# Patient Record
Sex: FEMALE | Race: WHITE
[De-identification: names, ages, dates, MRNs, and addresses within clinical notes are randomized per-mention and may not be internally consistent; named-entity substitution may affect disease eponyms.]

---

## 2018-04-23 ENCOUNTER — HOSPITAL ENCOUNTER (EMERGENCY)
Dept: HOSPITAL 43 - DL.ED | Age: 83
LOS: 1 days | Discharge: HOME | End: 2018-04-24
Payer: MEDICARE

## 2018-04-23 DIAGNOSIS — E11.9: ICD-10-CM

## 2018-04-23 DIAGNOSIS — Z88.5: ICD-10-CM

## 2018-04-23 DIAGNOSIS — M25.561: Primary | ICD-10-CM

## 2018-04-23 DIAGNOSIS — Z88.8: ICD-10-CM

## 2018-04-23 DIAGNOSIS — R53.1: ICD-10-CM

## 2018-04-23 DIAGNOSIS — Z88.1: ICD-10-CM

## 2018-04-23 DIAGNOSIS — Z79.4: ICD-10-CM

## 2018-04-23 LAB
CHLORIDE SERPL-SCNC: 102 MMOL/L (ref 101–111)
SODIUM SERPL-SCNC: 134 MMOL/L (ref 135–145)

## 2018-04-23 PROCEDURE — 99284 EMERGENCY DEPT VISIT MOD MDM: CPT

## 2018-04-23 PROCEDURE — 36415 COLL VENOUS BLD VENIPUNCTURE: CPT

## 2018-04-23 PROCEDURE — 73562 X-RAY EXAM OF KNEE 3: CPT

## 2018-04-23 PROCEDURE — 72192 CT PELVIS W/O DYE: CPT

## 2018-04-23 PROCEDURE — 85025 COMPLETE CBC W/AUTO DIFF WBC: CPT

## 2018-04-23 PROCEDURE — 81001 URINALYSIS AUTO W/SCOPE: CPT

## 2018-04-23 PROCEDURE — 80053 COMPREHEN METABOLIC PANEL: CPT

## 2018-04-24 VITALS — SYSTOLIC BLOOD PRESSURE: 113 MMHG | DIASTOLIC BLOOD PRESSURE: 62 MMHG

## 2018-04-24 NOTE — EDM.PDOC
ED HPI GENERAL MEDICAL PROBLEM





- General


Chief Complaint: Lower Extremity Injury/Pain


Stated Complaint: KNEE INJURY


Time Seen by Provider: 04/23/18 21:45


Source of Information: Reports: Patient, EMS, Family


History Limitations: Reports: No Limitations





- History of Present Illness


INITIAL COMMENTS - FREE TEXT/NARRATIVE: 





C/o right knee and leg pain. Stated felt weak like legs were going to give out 

while getting out of bed, lowered self to ground to knees then crawled to 

kitchen, pressed medical alert as unable to get up. Denied falling. Had similar 

episode this am. No recent fever, chils. No NVD.  no URI or urinary complaints. 

Lives at home alone. 


  ** Right Leg


Pain Score (Numeric/FACES): 7





- Related Data


 Allergies











Allergy/AdvReac Type Severity Reaction Status Date / Time


 


atorvastatin [From Lipitor] Allergy  Muscle Verified 11/18/16 13:06





   Aches  


 


erythromycin base Allergy  Hives Verified 11/18/16 13:06


 


ezetimibe Allergy  Cannot Verified 11/18/16 13:06





   Remember  


 


fluvastatin Allergy  Cannot Verified 11/18/16 13:06





   Remember  


 


losartan Allergy  Cannot Verified 11/18/16 13:06





   Remember  


 


pravastatin Allergy  Cannot Verified 11/18/16 13:06





   Remember  


 


simvastatin Allergy  Cannot Verified 11/18/16 13:06





   Remember  


 


tramadol Allergy  Cannot Verified 11/18/16 13:06





   Remember  











Home Meds: 


 Home Meds





Insulin Glarg,Human.Rec.Analog [LantUS Solostar] 62 units SQ BEDTIME 11/18/16 [

History]


Lisinopril [Zestril] 2.5 mg PO DAILY 11/18/16 [History]


oxyCODONE HCl/Acetaminophen [Endocet 5-325 Tablet] 1 tab PO Q4H PRN 11/29/16 [

History]











Past Medical History


HEENT History: Reports: Cataract, Impaired Vision


Musculoskeletal History: Reports: Other (See Below)


Endocrine/Metabolic History: Reports: Diabetes, Type II





- Past Surgical History


HEENT Surgical History: Reports: Cataract Surgery


GI Surgical History: Reports: Cholecystectomy


Musculoskeletal Surgical History: Reports: None, Hip Replacement





Social & Family History





- Tobacco Use


Smoking Status *Q: Never Smoker


Second Hand Smoke Exposure: No





- Caffeine Use


Caffeine Use: Reports: Coffee, Tea





- Recreational Drug Use


Recreational Drug Use: No





Review of Systems





- Review of Systems


Review Of Systems: ROS reveals no pertinent complaints other than HPI.





ED EXAM, GENERAL





- Physical Exam


Exam: See Below


Exam Limited By: No Limitations


General Appearance: Alert, No Apparent Distress, Thin


Eye Exam: Bilateral Eye: EOMI


Ears: Normal External Exam, Normal TMs


Nose: Normal Inspection


Throat/Mouth: Normal Inspection


Head: Atraumatic, Normocephalic


Neck: Normal Inspection


Respiratory/Chest: No Respiratory Distress, Lungs Clear, Normal Breath Sounds


Cardiovascular: Normal Peripheral Pulses, Regular Rate, Rhythm, No Edema


GI/Abdominal: Normal Bowel Sounds


Back Exam: Normal Inspection.  No: Paraspinal Tenderness, Vertebral Tenderness


Extremities: Leg Pain (right knee)


Neurological: Alert, Oriented, Normal Cognition, No Motor/Sensory Deficits


Psychiatric: Normal Affect


Skin Exam: Warm, Dry, Intact, Normal Color.  No: Ecchymosis





Course





- Vital Signs


Last Recorded V/S: 


 Last Vital Signs











Temp  98.4 F   04/24/18 00:12


 


Pulse  80   04/24/18 00:12


 


Resp  18   04/24/18 00:12


 


BP  113/62   04/24/18 00:12


 


Pulse Ox  95   04/24/18 00:12














- Orders/Labs/Meds


Orders: 


 Active Orders 24 hr











 Category Date Time Status


 


 UA W/MICROSCOPIC [URIN] Stat Lab  04/23/18 23:48 Ordered











Labs: 


 Laboratory Tests











  04/23/18 04/23/18 04/23/18 Range/Units





  22:50 22:50 23:48 


 


WBC  8.5    (5.0-10.0)  10^3/uL


 


RBC  4.59    (4.2-5.4)  10^6/uL


 


Hgb  13.9    (12.0-16.0)  g/dL


 


Hct  40.9    (37.0-47.0)  %


 


MCV  89.1    ()  fL


 


MCH  30.3    (27.0-34.0)  pg


 


MCHC  34.0    (33.0-35.0)  g/dL


 


Plt Count  208    (150-450)  10^3/uL


 


Neut % (Auto)  61.7    (42.2-75.2)  %


 


Lymph % (Auto)  28.2    (20.5-50.1)  %


 


Mono % (Auto)  8.3 H    (2-8)  %


 


Eos % (Auto)  1.6    (1.0-3.0)  %


 


Baso % (Auto)  0.2    (0.0-1.0)  %


 


Sodium   134 L   (135-145)  mmol/L


 


Potassium   3.9   (3.6-5.0)  mmol/L


 


Chloride   102   (101-111)  mmol/L


 


Carbon Dioxide   25.0   (21.0-31.0)  mmol/L


 


Anion Gap   10.9   


 


BUN   10   (7-18)  mg/dL


 


Creatinine   0.8   (0.6-1.3)  mg/dL


 


Est Cr Clr Drug Dosing   TNP   


 


Estimated GFR (MDRD)   > 60   


 


BUN/Creatinine Ratio   12.50   


 


Glucose   264 H   ()  mg/dL


 


Calcium   9.0   (8.4-10.2)  mg/dl


 


Total Bilirubin   0.5   (0.2-1.0)  mg/dL


 


AST   29   (10-42)  IU/L


 


ALT   22   (10-60)  IU/L


 


Alkaline Phosphatase   46   ()  IU/L


 


Total Protein   6.5 L   (6.7-8.2)  g/dl


 


Albumin   3.7   (3.2-5.5)  g/dl


 


Globulin   2.8   


 


Albumin/Globulin Ratio   1.32   


 


Urine Color    Yellow  (YELLOW)  


 


Urine Appearance    Clear  (CLEAR)  


 


Urine pH    7.5  (5.0-9.0)  


 


Ur Specific Gravity    1.020  (1.005-1.030)  


 


Urine Protein    Negative  (NEGATIVE)  


 


Urine Glucose (UA)    100 H  (NEGATIVE)  


 


Urine Ketones    Trace H  (NEGATIVE)  


 


Urine Occult Blood    Negative  (NEGATIVE)  


 


Urine Nitrite    Negative  (NEGATIVE)  


 


Urine Bilirubin    Negative  (NEGATIVE)  


 


Urine Urobilinogen    0.2  (0.2-1.0)  mg/dL


 


Ur Leukocyte Esterase    Negative  (NEGATIVE)  


 


Urine RBC    0-5  /HPF


 


Urine WBC    0-5  (0-5/HPF)  /HPF


 


Ur Epithelial Cells    Occasional  /HPF


 


Urine Bacteria    Few  (0-FEW/HPF)  /HPF











Meds: 


Medications














Discontinued Medications














Generic Name Dose Route Start Last Admin





  Trade Name Freq  PRN Reason Stop Dose Admin


 


Acetaminophen  650 mg  04/24/18 00:08  04/24/18 00:13





  Tylenol  PO  04/24/18 00:09  650 mg





  NOW ONE   Administration





     





     





     





     














- Radiology Interpretation


Free Text/Narrative:: 





right knee and pelvis negative. 





- Re-Assessments/Exams


Free Text/Narrative Re-Assessment/Exam: 





04/24/18 04:18


Family at bedside results relayed. Recommend family to stay with patient 

tonight and better determine ability to maintain in home environment. 





Departure





- Departure


Time of Disposition: 00:03


Disposition: Home, Self-Care 01


Condition: Fair


Clinical Impression: 


 Weakness





Knee pain, right


Qualifiers:


 Chronicity: acute Qualified Code(s): M25.561 - Pain in right knee








- Discharge Information


Instructions:  Knee Sprain, Adult, Easy-to-Read


Referrals: 


Joselin Urban PA [Primary Care Provider] - 


Forms:  ED Department Discharge


Additional Instructions: 


ambulate with walker


tylenol 650mg every 4  hours as needed for pain


follow up if worsens 





- My Orders


Last 24 Hours: 


My Active Orders





04/23/18 23:48


UA W/MICROSCOPIC [URIN] Stat 














- Assessment/Plan


Last 24 Hours: 


My Active Orders





04/23/18 23:48


UA W/MICROSCOPIC [URIN] Stat

## 2021-10-10 NOTE — CT
PROCEDURE INFORMATION: 

Exam: CT Head Without Contrast 

Exam date and time: 10/10/2021 12:56 PM 

Age: 89 years old 

Clinical indication: Dizziness 



TECHNIQUE: 

Imaging protocol: Computed tomography of the head without contrast. 

Radiation optimization: All CT scans at this facility use at least one of these 

dose optimization techniques: automated exposure control; mA and/or kV 

adjustment per patient size (includes targeted exams where dose is matched to 

clinical indication); or iterative reconstruction. 



COMPARISON: 

CT Head wo Cont 10/6/2015 12:29 AM 



FINDINGS: 

Brain: Atrophy. No hemorrhage. Chronic periventricular white matter changes.. 

No mass effect. 

Cerebral ventricles: Stable asymmetric enlargement of the left ventricle. 

Paranasal sinuses: Visualized sinuses are unremarkable. No fluid levels. 

Mastoid air cells: Visualized mastoid air cells are well aerated. 

Bones/joints: Unremarkable. No acute fracture. 

Soft tissues: Unremarkable. 



IMPRESSION: 

No acute intracranial abnormality.

## 2021-10-10 NOTE — PCM.HP
H&P History of Present Illness





- General


Date of Service: 10/10/21


Source of Information: Patient, Old Records, Provider (ER)


History Limitations: Reports: Other (poor historian)





- History of Present Illness


Initial Comments - Free Text/Narative: 


Patient was brought to ER form her assisted living  with c/o waking this morning

with "room spin" dizziness. Denies fall or head injury. Denies chest pain, rapid

or irregular HR, N/V/D, fever, chills, cough, SOB, or dysuria. She reports 

similar episode in the past. Pt states that laying still alleviates the 

dizziness. Moving her body, or even head movement makes the dizziness worse. She

laid on her bed all day to minimize the dizziness, and she missed her insulin 

then finally called the ambulance.


CT in ER showed no acute findings. Pt was prescribed Meclizine in ER and she is 

now symptoms free. 








- Related Data


Allergies/Adverse Reactions: 


                                    Allergies











Allergy/AdvReac Type Severity Reaction Status Date / Time


 


atorvastatin [From Lipitor] Allergy  Muscle Verified 10/10/21 12:43





   Aches  


 


erythromycin base Allergy  Hives Verified 10/10/21 12:43


 


ezetimibe Allergy  Cannot Verified 10/10/21 12:43





   Remember  


 


fluvastatin Allergy  Cannot Verified 10/10/21 12:43





   Remember  


 


losartan Allergy  Cannot Verified 10/10/21 12:43





   Remember  


 


pravastatin Allergy  Cannot Verified 10/10/21 12:43





   Remember  


 


simvastatin Allergy  Cannot Verified 10/10/21 12:43





   Remember  


 


tramadol Allergy  Cannot Verified 10/10/21 12:43





   Remember  











Home Medications: 


                                    Home Meds





Acetaminophen [Tylenol Extra Strength] 500 mg PO Q4H PRN 10/10/21 [History]


Ascorbic Acid [Vitamin C] 500 mg PO DAILY 10/10/21 [History]


Aspirin [Halfprin] 81 mg PO DAILY 10/10/21 [History]


Ca/D3/Mag Ox/Zinc//Barrett/Bor [Calcium 600-D3 Plus Caplet] 1 tab PO DAILY 

10/10/21 [History]


Calcium Carbonate [Tums] 500 mg PO Q4H PRN 10/10/21 [History]


DULoxetine HCl [Cymbalta] 30 mg PO DAILY 10/10/21 [History]


Diclofenac Sodium [Voltaren Arthritis Pain] 1 applic TOP Q12H PRN 10/10/21 

[History]


Fish Oil/DHA/EPA [Fish Oil 1,200 MG] 1 cap PO DAILY 10/10/21 [History]


Insulin Glarg,Human.Rec.Analog [Lantus] 37 units SQ DAILY 10/10/21 [History]


Magnesium Hydroxide [Milk of Magnesia] 30 ml PO DAILY PRN 10/10/21 [History]


Multivitamin [Multi-Day Vitamins] 1 tab PO DAILY 10/10/21 [History]


metFORMIN [Glucophage XR] 500 mg PO BIDMEALS 10/10/21 [History]


polyethylene glycoL 3350 [MiraLAX] 1 pack PO DAILY PRN 10/10/21 [History]











Past Medical History


HEENT History: Reports: Cataract, Impaired Vision


Musculoskeletal History: Reports: Other (See Below)


Endocrine/Metabolic History: Reports: Diabetes, Type II





- Past Surgical History


HEENT Surgical History: Reports: Cataract Surgery


GI Surgical History: Reports: Cholecystectomy


Musculoskeletal Surgical History: Reports: None, Hip Replacement





Social & Family History





- Tobacco Use


Tobacco Use Status *Q: Never Tobacco User


Second Hand Smoke Exposure: No





- Caffeine Use


Caffeine Use: Reports: None





- Recreational Drug Use


Recreational Drug Use: No





H&P Review of Systems





- Review of Systems:


Review Of Systems: Unable To Obtain


Reason Not Obtained: poor historian


General: Denies: Fever, Chills


HEENT: Reports: Vertigo


Pulmonary: Denies: Shortness of Breath


Cardiovascular: Denies: Chest Pain


Gastrointestinal: Denies: Abdominal Pain, Nausea


Genitourinary: Denies: Dysuria


Psychiatric: Denies: Confusion


Neurological: Denies: Confusion





Exam





- Exam


Exam: See Below





- Vital Signs


Vital Signs: 


                                Last Vital Signs











Temp  98.1 F   10/10/21 12:43


 


Pulse  84   10/10/21 12:43


 


Resp  20   10/10/21 12:43


 


BP  131/67   10/10/21 12:43


 


Pulse Ox  99   10/10/21 12:43











Weight: 171 lb





- Exam


Quality Assessment: No: Supplemental Oxygen


General: Alert, Oriented, Cooperative.  No: Mild Distress


HEENT: EOMI


Neck: Supple


Cardiovascular: Regular Rate, Regular Rhythm


GI/Abdominal Exam: Soft, Non-Tender


 (Female) Exam: Deferred


Back Exam: Normal Inspection


Extremities: Normal Inspection, No Pedal Edema


Skin: Warm, Dry


Neurological: Cranial Nerves Intact


Neuro Extensive - Mental Status: Alert, Oriented x3





- Patient Data


Lab Results Last 24 hrs: 


                         Laboratory Results - last 24 hr











  10/10/21 10/10/21 10/10/21 Range/Units





  12:38 13:02 13:02 


 


WBC   7.1   (5.0-10.0)  10^3/uL


 


RBC   4.69   (4.2-5.4)  10^6/uL


 


Hgb   14.4   (12.0-16.0)  g/dL


 


Hct   43.9   (37.0-47.0)  %


 


MCV   93.6  D   ()  fL


 


MCH   30.7   (27.0-34.0)  pg


 


MCHC   32.8 L   (33.0-35.0)  g/dL


 


Plt Count   234   (150-450)  10^3/uL


 


Neut % (Auto)   58.8   (42.2-75.2)  %


 


Lymph % (Auto)   30.0   (20.5-50.1)  %


 


Mono % (Auto)   8.5 H   (2-8)  %


 


Eos % (Auto)   2.1   (1.0-3.0)  %


 


Baso % (Auto)   0.6   (0.0-1.0)  %


 


Sodium    140  (136-145)  mmol/L


 


Potassium    4.1  (3.5-5.1)  mmol/L


 


Chloride    104  ()  mmol/L


 


Carbon Dioxide    27  (21-32)  mmol/L


 


Anion Gap    13.1 H  (7-13)  mEq/L


 


BUN    15  (7-18)  mg/dL


 


Creatinine    1.03 H  (0.55-1.02)  mg/dL


 


Est Cr Clr Drug Dosing    TNP  


 


Estimated GFR (MDRD)    50  


 


BUN/Creatinine Ratio    14.6  (No establ ref range)  


 


Glucose    133 H  (70-99)  mg/dL


 


POC Glucose  125 H    (70-99)  mg/dL


 


Calcium    8.9  (8.5-10.1)  mg/dL


 


Magnesium    2.0  (1.8-2.4)  mg/dL


 


Total Bilirubin    0.3  (0.2-1.0)  mg/dL


 


AST    18  (15-37)  U/L


 


ALT    28  (14-59)  U/L


 


Alkaline Phosphatase    70  ()  U/L


 


Troponin I High Sens    18  (<=51)  pg/mL


 


Total Protein    7.1  (6.4-8.2)  g/dL


 


Albumin    2.4 L  (3.4-5.0)  g/dL


 


Globulin    4.7  


 


Albumin/Globulin Ratio    0.51  


 


SARS CoV-2 RNA Rapid HANNAH     (NEGATIVE)  














  10/10/21 Range/Units





  15:09 


 


WBC   (5.0-10.0)  10^3/uL


 


RBC   (4.2-5.4)  10^6/uL


 


Hgb   (12.0-16.0)  g/dL


 


Hct   (37.0-47.0)  %


 


MCV   ()  fL


 


MCH   (27.0-34.0)  pg


 


MCHC   (33.0-35.0)  g/dL


 


Plt Count   (150-450)  10^3/uL


 


Neut % (Auto)   (42.2-75.2)  %


 


Lymph % (Auto)   (20.5-50.1)  %


 


Mono % (Auto)   (2-8)  %


 


Eos % (Auto)   (1.0-3.0)  %


 


Baso % (Auto)   (0.0-1.0)  %


 


Sodium   (136-145)  mmol/L


 


Potassium   (3.5-5.1)  mmol/L


 


Chloride   ()  mmol/L


 


Carbon Dioxide   (21-32)  mmol/L


 


Anion Gap   (7-13)  mEq/L


 


BUN   (7-18)  mg/dL


 


Creatinine   (0.55-1.02)  mg/dL


 


Est Cr Clr Drug Dosing   


 


Estimated GFR (MDRD)   


 


BUN/Creatinine Ratio   (No establ ref range)  


 


Glucose   (70-99)  mg/dL


 


POC Glucose   (70-99)  mg/dL


 


Calcium   (8.5-10.1)  mg/dL


 


Magnesium   (1.8-2.4)  mg/dL


 


Total Bilirubin   (0.2-1.0)  mg/dL


 


AST   (15-37)  U/L


 


ALT   (14-59)  U/L


 


Alkaline Phosphatase   ()  U/L


 


Troponin I High Sens   (<=51)  pg/mL


 


Total Protein   (6.4-8.2)  g/dL


 


Albumin   (3.4-5.0)  g/dL


 


Globulin   


 


Albumin/Globulin Ratio   


 


SARS CoV-2 RNA Rapid HANNAH  Negative  (NEGATIVE)  











Result Diagrams: 


                                 10/10/21 13:02





                                 10/10/21 13:02


Imaging Impressions Last 24 hrs: 


CT brqain: no acute.


ECG: Sinus rhythm


Problem List Initiated/Reviewed/Updated: Yes


Orders Last 24hrs: 


                               Active Orders 24 hr











 Category Date Time Status


 


 Blood Glucose Check, Bedside [RC] ONETIME Care  10/10/21 12:49 Active


 


 Orthostatic Vital Signs [RC] ASDIRECTED Care  10/10/21 12:50 Active


 


 UA RFX BLADIMIR AND CULT IF INDIC [URIN] Stat Lab  10/10/21 14:47 Ordered


 


 Sodium Chloride 0.9% [Normal Saline] 500 ml Med  10/10/21 15:00 Active





 IV .BOLUS   








                                Medication Orders





Sodium Chloride (Normal Saline)  500 mls @ 500 mls/hr IV .BOLUS CAROL


   Last Admin: 10/10/21 14:58  Dose: 500 mls/hr


   Documented by: MARIELLA








Assessment/Plan Comment:: 


Vertigo/ at risk for falls ; admitted for observation. PT/OT evaluation. 


Meclizine 25 mg BID PRN. 








DM type II : continue with home medication. 





DVT prophylaxis : Lovenox. 





DNR /DNI as per assisted living record.

## 2021-10-11 NOTE — PCM.PN
- General Info


Date of Service: 10/11/21


Subjective Update: 


feeling much better, requesting to go home. no more dizziness. 





Functional Status: Reports: Pain Controlled, Tolerating Diet, Ambulating 

(slowly)





- Review of Systems


General: Denies: Fever, Weakness


Pulmonary: Denies: Shortness of Breath


Cardiovascular: Denies: Chest Pain


Gastrointestinal: Denies: Abdominal Pain


Genitourinary: Denies: Dysuria


Neurological: Denies: Confusion


Psychiatric: Denies: Confusion





- Patient Data


Vitals - Most Recent: 


                                Last Vital Signs











Temp  97.8 F   10/11/21 12:00


 


Pulse  90   10/11/21 12:00


 


Resp  20   10/11/21 12:00


 


BP  122/65   10/11/21 12:00


 


Pulse Ox  95   10/11/21 12:00








                                        





Orthostatic Blood Pressure [     149/72


Standing]                        


Orthostatic Blood Pressure [     105/62


Sitting]                         


Orthostatic Blood Pressure [     121/57


Supine]                          








Weight - Most Recent: 160 lb 4.8 oz


I&O - Last 24 Hours: 


                                 Intake & Output











 10/10/21 10/11/21 10/11/21





 22:59 06:59 14:59


 


Intake Total  400 


 


Output Total 800 1375 


 


Balance -800 -975 











Lab Results Last 24 Hours: 


                         Laboratory Results - last 24 hr











  10/10/21 10/10/21 10/10/21 Range/Units





  13:02 13:02 15:09 


 


WBC  7.1    (5.0-10.0)  10^3/uL


 


RBC  4.69    (4.2-5.4)  10^6/uL


 


Hgb  14.4    (12.0-16.0)  g/dL


 


Hct  43.9    (37.0-47.0)  %


 


MCV  93.6  D    ()  fL


 


MCH  30.7    (27.0-34.0)  pg


 


MCHC  32.8 L    (33.0-35.0)  g/dL


 


Plt Count  234    (150-450)  10^3/uL


 


Neut % (Auto)  58.8    (42.2-75.2)  %


 


Lymph % (Auto)  30.0    (20.5-50.1)  %


 


Mono % (Auto)  8.5 H    (2-8)  %


 


Eos % (Auto)  2.1    (1.0-3.0)  %


 


Baso % (Auto)  0.6    (0.0-1.0)  %


 


Sodium   140   (136-145)  mmol/L


 


Potassium   4.1   (3.5-5.1)  mmol/L


 


Chloride   104   ()  mmol/L


 


Carbon Dioxide   27   (21-32)  mmol/L


 


Anion Gap   13.1 H   (7-13)  mEq/L


 


BUN   15   (7-18)  mg/dL


 


Creatinine   1.03 H   (0.55-1.02)  mg/dL


 


Est Cr Clr Drug Dosing   TNP   


 


Estimated GFR (MDRD)   50   


 


BUN/Creatinine Ratio   14.6   (No establ ref range)  


 


Glucose   133 H   (70-99)  mg/dL


 


POC Glucose     (70-99)  mg/dL


 


Calcium   8.9   (8.5-10.1)  mg/dL


 


Magnesium   2.0   (1.8-2.4)  mg/dL


 


Total Bilirubin   0.3   (0.2-1.0)  mg/dL


 


AST   18   (15-37)  U/L


 


ALT   28   (14-59)  U/L


 


Alkaline Phosphatase   70   ()  U/L


 


Troponin I High Sens   18   (<=51)  pg/mL


 


Total Protein   7.1   (6.4-8.2)  g/dL


 


Albumin   2.4 L   (3.4-5.0)  g/dL


 


Globulin   4.7   


 


Albumin/Globulin Ratio   0.51   


 


Urine Color     (YELLOW)  


 


Urine Appearance     (CLEAR)  


 


Urine pH     (5.0-9.0)  


 


Ur Specific Gravity     (1.005-1.030)  


 


Urine Protein     (NEGATIVE)  


 


Urine Glucose (UA)     (NEGATIVE)  


 


Urine Ketones     (NEGATIVE)  


 


Urine Occult Blood     (NEGATIVE)  


 


Urine Nitrite     (NEGATIVE)  


 


Urine Bilirubin     (NEGATIVE)  


 


Urine Urobilinogen     (0.2-1.0)  mg/dL


 


Ur Leukocyte Esterase     (NEGATIVE)  


 


Urine RBC     (0-5)  /HPF


 


Urine WBC     (0-5/HPF)  /HPF


 


Ur Epithelial Cells     (NOT SEEN)  /HPF


 


Urine Bacteria     (0-FEW/HPF)  /HPF


 


SARS CoV-2 RNA Rapid HANNAH    Negative  (NEGATIVE)  














  10/10/21 10/10/21 10/11/21 Range/Units





  17:00 20:41 00:08 


 


WBC     (5.0-10.0)  10^3/uL


 


RBC     (4.2-5.4)  10^6/uL


 


Hgb     (12.0-16.0)  g/dL


 


Hct     (37.0-47.0)  %


 


MCV     ()  fL


 


MCH     (27.0-34.0)  pg


 


MCHC     (33.0-35.0)  g/dL


 


Plt Count     (150-450)  10^3/uL


 


Neut % (Auto)     (42.2-75.2)  %


 


Lymph % (Auto)     (20.5-50.1)  %


 


Mono % (Auto)     (2-8)  %


 


Eos % (Auto)     (1.0-3.0)  %


 


Baso % (Auto)     (0.0-1.0)  %


 


Sodium     (136-145)  mmol/L


 


Potassium     (3.5-5.1)  mmol/L


 


Chloride     ()  mmol/L


 


Carbon Dioxide     (21-32)  mmol/L


 


Anion Gap     (7-13)  mEq/L


 


BUN     (7-18)  mg/dL


 


Creatinine     (0.55-1.02)  mg/dL


 


Est Cr Clr Drug Dosing     


 


Estimated GFR (MDRD)     


 


BUN/Creatinine Ratio     (No establ ref range)  


 


Glucose     (70-99)  mg/dL


 


POC Glucose   188 H  136 H  (70-99)  mg/dL


 


Calcium     (8.5-10.1)  mg/dL


 


Magnesium     (1.8-2.4)  mg/dL


 


Total Bilirubin     (0.2-1.0)  mg/dL


 


AST     (15-37)  U/L


 


ALT     (14-59)  U/L


 


Alkaline Phosphatase     ()  U/L


 


Troponin I High Sens     (<=51)  pg/mL


 


Total Protein     (6.4-8.2)  g/dL


 


Albumin     (3.4-5.0)  g/dL


 


Globulin     


 


Albumin/Globulin Ratio     


 


Urine Color  Yellow    (YELLOW)  


 


Urine Appearance  Cloudy    (CLEAR)  


 


Urine pH  7.5    (5.0-9.0)  


 


Ur Specific Gravity  1.020    (1.005-1.030)  


 


Urine Protein  Negative    (NEGATIVE)  


 


Urine Glucose (UA)  Negative    (NEGATIVE)  


 


Urine Ketones  Negative    (NEGATIVE)  


 


Urine Occult Blood  Trace-intact H    (NEGATIVE)  


 


Urine Nitrite  Positive H    (NEGATIVE)  


 


Urine Bilirubin  Negative    (NEGATIVE)  


 


Urine Urobilinogen  0.2    (0.2-1.0)  mg/dL


 


Ur Leukocyte Esterase  Large H    (NEGATIVE)  


 


Urine RBC  0-5    (0-5)  /HPF


 


Urine WBC  >100 H    (0-5/HPF)  /HPF


 


Ur Epithelial Cells  Few    (NOT SEEN)  /HPF


 


Urine Bacteria  Many H    (0-FEW/HPF)  /HPF


 


SARS CoV-2 RNA Rapid HANNAH     (NEGATIVE)  














  10/11/21 10/11/21 Range/Units





  08:11 11:28 


 


WBC    (5.0-10.0)  10^3/uL


 


RBC    (4.2-5.4)  10^6/uL


 


Hgb    (12.0-16.0)  g/dL


 


Hct    (37.0-47.0)  %


 


MCV    ()  fL


 


MCH    (27.0-34.0)  pg


 


MCHC    (33.0-35.0)  g/dL


 


Plt Count    (150-450)  10^3/uL


 


Neut % (Auto)    (42.2-75.2)  %


 


Lymph % (Auto)    (20.5-50.1)  %


 


Mono % (Auto)    (2-8)  %


 


Eos % (Auto)    (1.0-3.0)  %


 


Baso % (Auto)    (0.0-1.0)  %


 


Sodium    (136-145)  mmol/L


 


Potassium    (3.5-5.1)  mmol/L


 


Chloride    ()  mmol/L


 


Carbon Dioxide    (21-32)  mmol/L


 


Anion Gap    (7-13)  mEq/L


 


BUN    (7-18)  mg/dL


 


Creatinine    (0.55-1.02)  mg/dL


 


Est Cr Clr Drug Dosing    


 


Estimated GFR (MDRD)    


 


BUN/Creatinine Ratio    (No establ ref range)  


 


Glucose    (70-99)  mg/dL


 


POC Glucose  155 H  201 H  (70-99)  mg/dL


 


Calcium    (8.5-10.1)  mg/dL


 


Magnesium    (1.8-2.4)  mg/dL


 


Total Bilirubin    (0.2-1.0)  mg/dL


 


AST    (15-37)  U/L


 


ALT    (14-59)  U/L


 


Alkaline Phosphatase    ()  U/L


 


Troponin I High Sens    (<=51)  pg/mL


 


Total Protein    (6.4-8.2)  g/dL


 


Albumin    (3.4-5.0)  g/dL


 


Globulin    


 


Albumin/Globulin Ratio    


 


Urine Color    (YELLOW)  


 


Urine Appearance    (CLEAR)  


 


Urine pH    (5.0-9.0)  


 


Ur Specific Gravity    (1.005-1.030)  


 


Urine Protein    (NEGATIVE)  


 


Urine Glucose (UA)    (NEGATIVE)  


 


Urine Ketones    (NEGATIVE)  


 


Urine Occult Blood    (NEGATIVE)  


 


Urine Nitrite    (NEGATIVE)  


 


Urine Bilirubin    (NEGATIVE)  


 


Urine Urobilinogen    (0.2-1.0)  mg/dL


 


Ur Leukocyte Esterase    (NEGATIVE)  


 


Urine RBC    (0-5)  /HPF


 


Urine WBC    (0-5/HPF)  /HPF


 


Ur Epithelial Cells    (NOT SEEN)  /HPF


 


Urine Bacteria    (0-FEW/HPF)  /HPF


 


SARS CoV-2 RNA Rapid HANNAH    (NEGATIVE)  











[facility]enn Results Last 24 Hours: 


                                  Microbiology











 10/10/21 17:00 Urine Culture - Preliminary





 Urine, Voided 











Med Orders - Current: 


                               Current Medications





Acetaminophen (Acetaminophen 500 Mg Tab)  500 mg PO Q4H PRN


   PRN Reason: Pain (mild 1-3)


   Last Admin: 10/11/21 03:22 Dose:  500 mg


   Documented by: 


Ascorbic Acid (Ascorbic Acid 500 Mg Tab)  500 mg PO DAILY [facility]


   Last Admin: 10/11/21 10:14 Dose:  500 mg


   Documented by: 


Aspirin (Aspirin 81 Mg Tab.Ec)  81 mg PO DAILY [facility]


   Last Admin: 10/11/21 10:14 Dose:  81 mg


   Documented by: 


Calcium Carbonate (Calcium Carbonate/Vitamin D3 1250 Mg-5 Mcg Tab)  1 tab PO 

DAILY [facility]


   Last Admin: 10/11/21 10:14 Dose:  1 tab


   Documented by: 


Calcium Carbonate/Glycine (Calcium Carbonate 500 Mg Tab.Chew)  500 mg PO Q4H PRN


   PRN Reason: Heartburn


Dextrose/Water (50% Dextrose In Water 50 Ml Syringe)  50 ml IVPUSH Q15M PRN


   PRN Reason: Hypoglycemia


Docusate Sodium (Docusate Sodium 100 Mg Cap)  100 mg PO BID PRN


   PRN Reason: Constipation


Duloxetine HCl (Duloxetine 30 Mg Cap)  30 mg PO DAILY [facility]


   Last Admin: 10/11/21 10:14 Dose:  30 mg


   Documented by: 


Enoxaparin Sodium (Enoxaparin 40 Mg/0.4 Ml Syringe)  40 mg SUBCUT DAILY [facility]


   Last Admin: 10/11/21 10:14 Dose:  40 mg


   Documented by: 


Glucagon (Glucagon,Human Recombinant 1 Mg Vial)  1 mg IM Q15M PRN


   PRN Reason: Hypoglycemia


Ceftriaxone Sodium 1 gm/ (Sodium Chloride)  50 mls @ 100 mls/hr IV Q24H [facility]


   Last Admin: 10/10/21 19:59 Dose:  100 mls/hr


   Documented by: 


Insulin Glargine (Insulin Glarg,Human.Rec.Analog 100 Unit/Ml)  37 unit SUBCUT 

DAILY [facility]


   Last Admin: 10/11/21 10:13 Dose:  37 units


   Documented by: 


Magnesium Hydroxide (Magnesium Hydroxide 400 Mg/5 Ml Susp 30 Ml Cup)  30 ml PO 

DAILY PRN


   PRN Reason: Constipation


Meclizine HCl (Meclizine 12.5 Mg Tab)  25 mg PO Q12H PRN


   PRN Reason: vertigo


Multivitamins/Minerals/Vitamin C (Multivitamin Tab)  1 tab PO DAILY [facility]


   Last Admin: 10/11/21 10:14 Dose:  1 tab


   Documented by: 


Ondansetron HCl (Ondansetron 4 Mg/2 Ml Sdv)  4 mg IVPUSH Q6H PRN


   PRN Reason: Nausea/Vomiting


Polyethylene Glycol (Polyethylene Glycol 3350 Powder 17 Gm Packet)  17 gm PO 

DAILY PRN


   PRN Reason: Constipation





Discontinued Medications





Ceftriaxone Sodium (Ceftriaxone 1 Gm Vial) Confirm Administered Dose 1 gm .ROUTE

.STK-MED ONE


   Stop: 10/10/21 19:47


   Last Admin: 10/10/21 20:11 Dose:  Not Given


   Documented by: 


Sodium Chloride (Normal Saline)  500 mls @ 500 mls/hr IV .BOLUS [facility]


   Last Infusion: 10/10/21 18:03 Dose:  Infused


   Documented by: 


Meclizine HCl (Meclizine 12.5 Mg Tab)  25 mg PO ONETIME ONE


   Stop: 10/10/21 14:51


   Last Admin: 10/10/21 14:59 Dose:  25 mg


   Documented by: 











- Exam


Quality Assessment: No: Supplemental Oxygen


General: Alert, Oriented


HEENT: EOMI


Lungs: Clear to Auscultation


Cardiovascular: Regular Rate, Regular Rhythm


GI/Abdominal Exam: Soft


 (Female) Exam: No: Normal External Exam


Back Exam: Normal Inspection


Extremities: Normal Inspection


Skin: Dry


Neurological: No New Focal Deficit





- Patient Data


Lab Results Last 24 hrs: 


                         Laboratory Results - last 24 hr











  10/10/21 10/10/21 10/10/21 Range/Units





  13:02 13:02 15:09 


 


WBC  7.1    (5.0-10.0)  10^3/uL


 


RBC  4.69    (4.2-5.4)  10^6/uL


 


Hgb  14.4    (12.0-16.0)  g/dL


 


Hct  43.9    (37.0-47.0)  %


 


MCV  93.6  D    ()  fL


 


MCH  30.7    (27.0-34.0)  pg


 


MCHC  32.8 L    (33.0-35.0)  g/dL


 


Plt Count  234    (150-450)  10^3/uL


 


Neut % (Auto)  58.8    (42.2-75.2)  %


 


Lymph % (Auto)  30.0    (20.5-50.1)  %


 


Mono % (Auto)  8.5 H    (2-8)  %


 


Eos % (Auto)  2.1    (1.0-3.0)  %


 


Baso % (Auto)  0.6    (0.0-1.0)  %


 


Sodium   140   (136-145)  mmol/L


 


Potassium   4.1   (3.5-5.1)  mmol/L


 


Chloride   104   ()  mmol/L


 


Carbon Dioxide   27   (21-32)  mmol/L


 


Anion Gap   13.1 H   (7-13)  mEq/L


 


BUN   15   (7-18)  mg/dL


 


Creatinine   1.03 H   (0.55-1.02)  mg/dL


 


Est Cr Clr Drug Dosing   TNP   


 


Estimated GFR (MDRD)   50   


 


BUN/Creatinine Ratio   14.6   (No establ ref range)  


 


Glucose   133 H   (70-99)  mg/dL


 


POC Glucose     (70-99)  mg/dL


 


Calcium   8.9   (8.5-10.1)  mg/dL


 


Magnesium   2.0   (1.8-2.4)  mg/dL


 


Total Bilirubin   0.3   (0.2-1.0)  mg/dL


 


AST   18   (15-37)  U/L


 


ALT   28   (14-59)  U/L


 


Alkaline Phosphatase   70   ()  U/L


 


Troponin I High Sens   18   (<=51)  pg/mL


 


Total Protein   7.1   (6.4-8.2)  g/dL


 


Albumin   2.4 L   (3.4-5.0)  g/dL


 


Globulin   4.7   


 


Albumin/Globulin Ratio   0.51   


 


Urine Color     (YELLOW)  


 


Urine Appearance     (CLEAR)  


 


Urine pH     (5.0-9.0)  


 


Ur Specific Gravity     (1.005-1.030)  


 


Urine Protein     (NEGATIVE)  


 


Urine Glucose (UA)     (NEGATIVE)  


 


Urine Ketones     (NEGATIVE)  


 


Urine Occult Blood     (NEGATIVE)  


 


Urine Nitrite     (NEGATIVE)  


 


Urine Bilirubin     (NEGATIVE)  


 


Urine Urobilinogen     (0.2-1.0)  mg/dL


 


Ur Leukocyte Esterase     (NEGATIVE)  


 


Urine RBC     (0-5)  /HPF


 


Urine WBC     (0-5/HPF)  /HPF


 


Ur Epithelial Cells     (NOT SEEN)  /HPF


 


Urine Bacteria     (0-FEW/HPF)  /HPF


 


SARS CoV-2 RNA Rapid HANNAH    Negative  (NEGATIVE)  














  10/10/21 10/10/21 10/11/21 Range/Units





  17:00 20:41 00:08 


 


WBC     (5.0-10.0)  10^3/uL


 


RBC     (4.2-5.4)  10^6/uL


 


Hgb     (12.0-16.0)  g/dL


 


Hct     (37.0-47.0)  %


 


MCV     ()  fL


 


MCH     (27.0-34.0)  pg


 


MCHC     (33.0-35.0)  g/dL


 


Plt Count     (150-450)  10^3/uL


 


Neut % (Auto)     (42.2-75.2)  %


 


Lymph % (Auto)     (20.5-50.1)  %


 


Mono % (Auto)     (2-8)  %


 


Eos % (Auto)     (1.0-3.0)  %


 


Baso % (Auto)     (0.0-1.0)  %


 


Sodium     (136-145)  mmol/L


 


Potassium     (3.5-5.1)  mmol/L


 


Chloride     ()  mmol/L


 


Carbon Dioxide     (21-32)  mmol/L


 


Anion Gap     (7-13)  mEq/L


 


BUN     (7-18)  mg/dL


 


Creatinine     (0.55-1.02)  mg/dL


 


Est Cr Clr Drug Dosing     


 


Estimated GFR (MDRD)     


 


BUN/Creatinine Ratio     (No establ ref range)  


 


Glucose     (70-99)  mg/dL


 


POC Glucose   188 H  136 H  (70-99)  mg/dL


 


Calcium     (8.5-10.1)  mg/dL


 


Magnesium     (1.8-2.4)  mg/dL


 


Total Bilirubin     (0.2-1.0)  mg/dL


 


AST     (15-37)  U/L


 


ALT     (14-59)  U/L


 


Alkaline Phosphatase     ()  U/L


 


Troponin I High Sens     (<=51)  pg/mL


 


Total Protein     (6.4-8.2)  g/dL


 


Albumin     (3.4-5.0)  g/dL


 


Globulin     


 


Albumin/Globulin Ratio     


 


Urine Color  Yellow    (YELLOW)  


 


Urine Appearance  Cloudy    (CLEAR)  


 


Urine pH  7.5    (5.0-9.0)  


 


Ur Specific Gravity  1.020    (1.005-1.030)  


 


Urine Protein  Negative    (NEGATIVE)  


 


Urine Glucose (UA)  Negative    (NEGATIVE)  


 


Urine Ketones  Negative    (NEGATIVE)  


 


Urine Occult Blood  Trace-intact H    (NEGATIVE)  


 


Urine Nitrite  Positive H    (NEGATIVE)  


 


Urine Bilirubin  Negative    (NEGATIVE)  


 


Urine Urobilinogen  0.2    (0.2-1.0)  mg/dL


 


Ur Leukocyte Esterase  Large H    (NEGATIVE)  


 


Urine RBC  0-5    (0-5)  /HPF


 


Urine WBC  >100 H    (0-5/HPF)  /HPF


 


Ur Epithelial Cells  Few    (NOT SEEN)  /HPF


 


Urine Bacteria  Many H    (0-FEW/HPF)  /HPF


 


SARS CoV-2 RNA Rapid HANNAH     (NEGATIVE)  














  10/11/21 10/11/21 Range/Units





  08:11 11:28 


 


WBC    (5.0-10.0)  10^3/uL


 


RBC    (4.2-5.4)  10^6/uL


 


Hgb    (12.0-16.0)  g/dL


 


Hct    (37.0-47.0)  %


 


MCV    ()  fL


 


MCH    (27.0-34.0)  pg


 


MCHC    (33.0-35.0)  g/dL


 


Plt Count    (150-450)  10^3/uL


 


Neut % (Auto)    (42.2-75.2)  %


 


Lymph % (Auto)    (20.5-50.1)  %


 


Mono % (Auto)    (2-8)  %


 


Eos % (Auto)    (1.0-3.0)  %


 


Baso % (Auto)    (0.0-1.0)  %


 


Sodium    (136-145)  mmol/L


 


Potassium    (3.5-5.1)  mmol/L


 


Chloride    ()  mmol/L


 


Carbon Dioxide    (21-32)  mmol/L


 


Anion Gap    (7-13)  mEq/L


 


BUN    (7-18)  mg/dL


 


Creatinine    (0.55-1.02)  mg/dL


 


Est Cr Clr Drug Dosing    


 


Estimated GFR (MDRD)    


 


BUN/Creatinine Ratio    (No establ ref range)  


 


Glucose    (70-99)  mg/dL


 


POC Glucose  155 H  201 H  (70-99)  mg/dL


 


Calcium    (8.5-10.1)  mg/dL


 


Magnesium    (1.8-2.4)  mg/dL


 


Total Bilirubin    (0.2-1.0)  mg/dL


 


AST    (15-37)  U/L


 


ALT    (14-59)  U/L


 


Alkaline Phosphatase    ()  U/L


 


Troponin I High Sens    (<=51)  pg/mL


 


Total Protein    (6.4-8.2)  g/dL


 


Albumin    (3.4-5.0)  g/dL


 


Globulin    


 


Albumin/Globulin Ratio    


 


Urine Color    (YELLOW)  


 


Urine Appearance    (CLEAR)  


 


Urine pH    (5.0-9.0)  


 


Ur Specific Gravity    (1.005-1.030)  


 


Urine Protein    (NEGATIVE)  


 


Urine Glucose (UA)    (NEGATIVE)  


 


Urine Ketones    (NEGATIVE)  


 


Urine Occult Blood    (NEGATIVE)  


 


Urine Nitrite    (NEGATIVE)  


 


Urine Bilirubin    (NEGATIVE)  


 


Urine Urobilinogen    (0.2-1.0)  mg/dL


 


Ur Leukocyte Esterase    (NEGATIVE)  


 


Urine RBC    (0-5)  /HPF


 


Urine WBC    (0-5/HPF)  /HPF


 


Ur Epithelial Cells    (NOT SEEN)  /HPF


 


Urine Bacteria    (0-FEW/HPF)  /HPF


 


SARS CoV-2 RNA Rapid HANNAH    (NEGATIVE)  











Result Diagrams: 


                                 10/10/21 13:02





                                 10/10/21 13:02


[facility] Results Last 24 hrs: 


                                  Microbiology











 10/10/21 17:00 Urine Culture - Preliminary





 Urine, Voided 














Sepsis Event Note





- Evaluation


Sepsis Screening Result: No Definite Risk





- Focused Exam


Vital Signs: 


                                   Vital Signs











  Temp Pulse Resp BP BP Pulse Ox


 


 10/11/21 12:00  97.8 F  90  20  122/65   95


 


 10/11/21 08:00  98.4 F  98  20  105/62   94 L


 


 10/11/21 04:00  98.0 F  94  23 H   113/81  94 L














- Problem List Review


Problem List Initiated/Reviewed/Updated: No





- My Orders


Last 24 Hours: 


My Active Orders





10/10/21 17:10


Blood Glucose Check, Bedside [RC] WITHMEALSANDBED 


Acetaminophen [Tylenol Extra Strength]   500 mg PO Q4H PRN 


Calcium Carbonate [Tums]   500 mg PO Q4H PRN 


Dextrose 50% in Water   50 ml IVPUSH Q15M PRN 


Glucagon,Human Recombinant [GlucaGen]   1 mg IM Q15M PRN 


Magnesium Hydroxide [Milk of Magnesia]   30 ml PO DAILY PRN 


polyethylene glycoL 3350 [MiraLAX]   17 gm PO DAILY PRN 





10/10/21 17:12


Height and Weight [RC] .0600 


Oxygen Therapy [RC] PRN 


Up With Assistance [RC] ASDIRECTED 


VTE/DVT Education [RC] 09,21 


Vital Signs [RC] 00,04,08,12,16,20 


OT Evaluation and Treatment [CONS] Routine 


PT Evaluation and Treatment [CONS] Routine 


Docusate Sodium [Colace]   100 mg PO BID PRN 


Ondansetron [Zofran]   4 mg IVPUSH Q6H PRN 


Resuscitation Status Routine 





10/10/21 17:13


Intake and Output [RC] 06,14,22 





10/10/21 17:15


Cardiac Monitoring [RC] 08,20 


Consistent Carbohydrate Diet [DIET] 





10/10/21 17:16


Orthostatic Vital Signs [RC] 08 





10/10/21 17:25


Meclizine [Antivert]   25 mg PO Q12H PRN 





10/10/21 18:55


Admission Diagnosis [ADT] Routine 


Patient Status [ADT] Routine 





10/10/21 19:00


cefTRIAXone [Rocephin] 1 gm   Sodium Chloride 0.9% [Normal Saline] 50 ml IV Q24H







10/11/21 09:00


Ascorbic Acid [Vitamin C]   500 mg PO DAILY 


Aspirin [Halfprin]   81 mg PO DAILY 


Calcium Carbonate/Vitamin D3 [Calcium Carbonate/Vitamin D 1250 MG - 5 MCG]   1 

tab PO DAILY 


DULoxetine [Cymbalta]   30 mg PO DAILY 


Enoxaparin [Lovenox]   40 mg SUBCUT DAILY 


Insulin Glarg,Human.Rec.Analog [LantUS]   37 unit SUBCUT DAILY 


Multivitamins [Tab-A-Helga]   1 tab PO DAILY 














- Plan


Plan:: 


Vertigo/Meclizine 25 mg BID PRN.  symptoms resolved. 


at risk for falls ; admitted for observation. PT/OT evaluation. Family are loo

wagner into the option of NH placement. 





DM type II : continue with home medication. 





Bacteruria : ? Asymptomatic UTI: awaiting cult results. last dose of Ceftriaxone

 in  AM. 


DVT prophylaxis : Lovenox. 





DNR /DNI as per assisted living record.

## 2021-10-11 NOTE — EDM.PDOC
Scribed by Catalina Otoole 10/10/21 [id number] for Sang Almaguer MD





ED HPI GENERAL MEDICAL PROBLEM





- General


Chief Complaint: General


Stated Complaint: IN BY AMBULANCE


Time Seen by Provider: 10/10/21 12:36


Source of Information: Reports: Patient, EMS, EMS Notes Reviewed, RN, RN Notes 

Reviewed


History Limitations: Reports: No Limitations





- History of Present Illness


INITIAL COMMENTS - FREE TEXT/NARRATIVE: 


Patient arrives by [facility] with c/o waking this morning 

with "room spin" dizziness. Denies fall or head injury. Denies chest pain, rapid

or irregular HR, N/V/D, fever, chills, cough, SOB, or dysuria. Denies headache, 

visual changes, sinus pain/pressure, congestion, or neck pain. Denies Hx of 

vertigo. Pt states that laying still alleviates the dizziness. Moving her body, 

or even head movement makes the dizziness worse. She laid on her bed all day to 

minimize the dizziness, then finally called the ambulance.


Duration: Constant


Location: Reports: Generalized


Quality: Reports: Other (Denies pain)


Severity: Moderate


Associated Symptoms: Reports: No Other Symptoms





- Related Data


                                    Allergies











Allergy/AdvReac Type Severity Reaction Status Date / Time


 


atorvastatin [From Lipitor] Allergy  Muscle Verified 10/10/21 12:43





   Aches  


 


erythromycin base Allergy  Hives Verified 10/10/21 12:43


 


ezetimibe Allergy  Cannot Verified 10/10/21 12:43





   Remember  


 


fluvastatin Allergy  Cannot Verified 10/10/21 12:43





   Remember  


 


losartan Allergy  Cannot Verified 10/10/21 12:43





   Remember  


 


pravastatin Allergy  Cannot Verified 10/10/21 12:43





   Remember  


 


simvastatin Allergy  Cannot Verified 10/10/21 12:43





   Remember  


 


tramadol Allergy  Cannot Verified 10/10/21 12:43





   Remember  











Home Meds: 


                                    Home Meds





Acetaminophen [Tylenol Extra Strength] 500 mg PO Q4H PRN 10/10/21 [History]


Ascorbic Acid [Vitamin C] 500 mg PO DAILY 10/10/21 [History]


Aspirin [Halfprin] 81 mg PO DAILY 10/10/21 [History]


Ca/D3/Mag Ox/Zinc//Barrett/Bor [Calcium 600-D3 Plus Caplet] 1 tab PO DAILY 

10/10/21 [History]


Calcium Carbonate [Tums] 500 mg PO Q4H PRN 10/10/21 [History]


DULoxetine HCl [Cymbalta] 30 mg PO DAILY 10/10/21 [History]


Diclofenac Sodium [Voltaren Arthritis Pain] 1 applic TOP Q12H PRN 10/10/21 

[History]


Fish Oil/DHA/EPA [Fish Oil 1,200 MG] 1 cap PO DAILY 10/10/21 [History]


Insulin Glarg,Human.Rec.Analog [Lantus] 37 units SQ DAILY 10/10/21 [History]


Magnesium Hydroxide [Milk of Magnesia] 30 ml PO DAILY PRN 10/10/21 [History]


Multivitamin [Multi-Day Vitamins] 1 tab PO DAILY 10/10/21 [History]


metFORMIN [Glucophage XR] 500 mg PO BIDMEALS 10/10/21 [History]


polyethylene glycoL 3350 [MiraLAX] 1 pack PO DAILY PRN 10/10/21 [History]











Past Medical History


HEENT History: Reports: Cataract, Impaired Vision


Musculoskeletal History: Reports: Other (See Below)


Endocrine/Metabolic History: Reports: Diabetes, Type II





- Past Surgical History


HEENT Surgical History: Reports: Cataract Surgery


GI Surgical History: Reports: Cholecystectomy


Musculoskeletal Surgical History: Reports: None, Hip Replacement





Social & Family History





- Caffeine Use


Caffeine Use: Reports: Coffee, Tea





- Living Situation & Occupation


Living situation: Reports: Alone, Assisted Living


Occupation: Retired





ED ROS GENERAL





- Review of Systems


Review Of Systems: Comprehensive ROS is negative, except as noted in HPI.





ED EXAM, GENERAL





- Physical Exam


Exam: See Below


Exam Limited By: No Limitations


General Appearance: Alert, No Apparent Distress, Other (Frail elderly appearing)


Eye Exam: Bilateral Eye: EOMI, Nystagmus (left lateral gaze), PERRL


Ears: Normal External Exam, Hearing Grossly Normal


Nose: Normal Inspection, Normal Mucosa, No Blood


Throat/Mouth: Normal Inspection, Normal Lips, Normal Voice, No Airway Compromise


Head: Atraumatic, Normocephalic


Neck: Normal Inspection, Non-Tender, Full Range of Motion


Respiratory/Chest: No Respiratory Distress, No Accessory Muscle Use, Chest 

Non-Tender, Decreased Breath Sounds


Cardiovascular: Regular Rate, Rhythm, No Edema


GI/Abdominal: Normal Bowel Sounds, Soft, Non-Tender


Back Exam: Normal Inspection


Extremities: Normal Inspection


Neurological: Alert, Oriented, CN II-XII Intact, Normal Cognition, No 

Motor/Sensory Deficits


Psychiatric: Normal Mood, Flat Affect


Skin Exam: Warm, Dry, Intact, Normal Color, No Rash


  ** #1 Interpretation


EKG Date: 10/10/21


Time: 13:30


Rhythm: Other (sinus rhythm)


Rate (Beats/Min): 84


Axis: Normal


P-Wave: Present


QRS: Other (inferior and anterior Q wave.)


ST-T: Normal


QT: Normal


SC/PQ Interval: Prolonged SC interval.





Course





- Vital Signs


Last Recorded V/S: 


                                Last Vital Signs











Temp  98.0 F   10/11/21 04:00


 


Pulse  94   10/11/21 04:00


 


Resp  23 H  10/11/21 04:00


 


BP  113/81   10/11/21 04:00


 


Pulse Ox  94 L  10/11/21 04:00














- Orders/Labs/Meds


Orders: 


                               Active Orders 24 hr











 Category Date Time Status


 


 Orthostatic Vital Signs [RC] ASDIRECTED Care  10/10/21 12:50 Active


 


 CULTURE URINE [RM] Stat Lab  10/10/21 17:00 Results








                                Medication Orders





Acetaminophen (Acetaminophen 500 Mg Tab)  500 mg PO Q4H PRN


   PRN Reason: Pain


   Last Admin: 10/11/21 03:22  Dose: 500 mg


   Documented by: BJ


Acetaminophen (Acetaminophen 325 Mg Tab)  650 mg PO Q4H PRN


   PRN Reason: Pain (Mild 1-3)/fever


Ascorbic Acid (Ascorbic Acid 500 Mg Tab)  500 mg PO DAILY [facility]


Aspirin (Aspirin 81 Mg Tab.Ec)  81 mg PO DAILY [facility]


Calcium Carbonate (Calcium Carbonate/Vitamin D3 1250 Mg-5 Mcg Tab)  1 tab PO 

DAILY [facility]


Calcium Carbonate/Glycine (Calcium Carbonate 500 Mg Tab.Chew)  500 mg PO Q4H PRN


   PRN Reason: Heartburn


Dextrose/Water (50% Dextrose In Water 50 Ml Syringe)  50 ml IVPUSH Q15M PRN


   PRN Reason: Hypoglycemia


Docusate Sodium (Docusate Sodium 100 Mg Cap)  100 mg PO BID PRN


   PRN Reason: Constipation


Duloxetine HCl (Duloxetine 30 Mg Cap)  30 mg PO DAILY [facility]


Enoxaparin Sodium (Enoxaparin 40 Mg/0.4 Ml Syringe)  40 mg SUBCUT DAILY [facility]


Glucagon (Glucagon,Human Recombinant 1 Mg Vial)  1 mg IM Q15M PRN


   PRN Reason: Hypoglycemia


Ceftriaxone Sodium 1 gm/ (Sodium Chloride)  50 mls @ 100 mls/hr IV Q24H [facility]


   Last Admin: 10/10/21 19:59  Dose: 100 mls/hr


   Documented by: BJ


Insulin Glargine (Insulin Glarg,Human.Rec.Analog 100 Unit/Ml)  37 unit SUBCUT 

DAILY [facility]


Magnesium Hydroxide (Magnesium Hydroxide 400 Mg/5 Ml Susp 30 Ml Cup)  30 ml PO 

DAILY PRN


   PRN Reason: Constipation


Meclizine HCl (Meclizine 12.5 Mg Tab)  25 mg PO Q12H PRN


   PRN Reason: vertigo


Multivitamins/Minerals/Vitamin C (Multivitamin Tab)  1 tab PO DAILY [facility]


Non-Formulary Medication (Diclofenac Sodium [Voltaren Arthritis Pain])  1 applic

TOP Q12H PRN


   PRN Reason: Low Back Pain


Ondansetron HCl (Ondansetron 4 Mg/2 Ml Sdv)  4 mg IVPUSH Q6H PRN


   PRN Reason: Nausea/Vomiting


Polyethylene Glycol (Polyethylene Glycol 3350 Powder 17 Gm Packet)  17 gm PO 

DAILY PRN


   PRN Reason: Constipation








Labs: 


                                Laboratory Tests











  10/10/21 10/10/21 10/10/21 Range/Units





  12:38 13:02 13:02 


 


WBC   7.1   (5.0-10.0)  10^3/uL


 


RBC   4.69   (4.2-5.4)  10^6/uL


 


Hgb   14.4   (12.0-16.0)  g/dL


 


Hct   43.9   (37.0-47.0)  %


 


MCV   93.6  D   ()  fL


 


MCH   30.7   (27.0-34.0)  pg


 


MCHC   32.8 L   (33.0-35.0)  g/dL


 


Plt Count   234   (150-450)  10^3/uL


 


Neut % (Auto)   58.8   (42.2-75.2)  %


 


Lymph % (Auto)   30.0   (20.5-50.1)  %


 


Mono % (Auto)   8.5 H   (2-8)  %


 


Eos % (Auto)   2.1   (1.0-3.0)  %


 


Baso % (Auto)   0.6   (0.0-1.0)  %


 


Sodium    140  (136-145)  mmol/L


 


Potassium    4.1  (3.5-5.1)  mmol/L


 


Chloride    104  ()  mmol/L


 


Carbon Dioxide    27  (21-32)  mmol/L


 


Anion Gap    13.1 H  (7-13)  mEq/L


 


BUN    15  (7-18)  mg/dL


 


Creatinine    1.03 H  (0.55-1.02)  mg/dL


 


Est Cr Clr Drug Dosing    TNP  


 


Estimated GFR (MDRD)    50  


 


BUN/Creatinine Ratio    14.6  (No establ ref range)  


 


Glucose    133 H  (70-99)  mg/dL


 


POC Glucose  125 H    (70-99)  mg/dL


 


Calcium    8.9  (8.5-10.1)  mg/dL


 


Magnesium    2.0  (1.8-2.4)  mg/dL


 


Total Bilirubin    0.3  (0.2-1.0)  mg/dL


 


AST    18  (15-37)  U/L


 


ALT    28  (14-59)  U/L


 


Alkaline Phosphatase    70  ()  U/L


 


Troponin I High Sens    18  (<=51)  pg/mL


 


Total Protein    7.1  (6.4-8.2)  g/dL


 


Albumin    2.4 L  (3.4-5.0)  g/dL


 


Globulin    4.7  


 


Albumin/Globulin Ratio    0.51  


 


SARS CoV-2 RNA Rapid HANNAH     (NEGATIVE)  














  10/10/21 Range/Units





  15:09 


 


WBC   (5.0-10.0)  10^3/uL


 


RBC   (4.2-5.4)  10^6/uL


 


Hgb   (12.0-16.0)  g/dL


 


Hct   (37.0-47.0)  %


 


MCV   ()  fL


 


MCH   (27.0-34.0)  pg


 


MCHC   (33.0-35.0)  g/dL


 


Plt Count   (150-450)  10^3/uL


 


Neut % (Auto)   (42.2-75.2)  %


 


Lymph % (Auto)   (20.5-50.1)  %


 


Mono % (Auto)   (2-8)  %


 


Eos % (Auto)   (1.0-3.0)  %


 


Baso % (Auto)   (0.0-1.0)  %


 


Sodium   (136-145)  mmol/L


 


Potassium   (3.5-5.1)  mmol/L


 


Chloride   ()  mmol/L


 


Carbon Dioxide   (21-32)  mmol/L


 


Anion Gap   (7-13)  mEq/L


 


BUN   (7-18)  mg/dL


 


Creatinine   (0.55-1.02)  mg/dL


 


Est Cr Clr Drug Dosing   


 


Estimated GFR (MDRD)   


 


BUN/Creatinine Ratio   (No establ ref range)  


 


Glucose   (70-99)  mg/dL


 


POC Glucose   (70-99)  mg/dL


 


Calcium   (8.5-10.1)  mg/dL


 


Magnesium   (1.8-2.4)  mg/dL


 


Total Bilirubin   (0.2-1.0)  mg/dL


 


AST   (15-37)  U/L


 


ALT   (14-59)  U/L


 


Alkaline Phosphatase   ()  U/L


 


Troponin I High Sens   (<=51)  pg/mL


 


Total Protein   (6.4-8.2)  g/dL


 


Albumin   (3.4-5.0)  g/dL


 


Globulin   


 


Albumin/Globulin Ratio   


 


SARS CoV-2 RNA Rapid HANNAH  Negative  (NEGATIVE)  











Meds: 


Medications











Generic Name Dose Route Start Last Admin





  Trade Name Freq  PRN Reason Stop Dose Admin


 


Acetaminophen  500 mg  10/10/21 17:10  10/11/21 03:22





  Acetaminophen 500 Mg Tab  PO   500 mg





  Q4H PRN   Administration





  Pain  


 


Acetaminophen  650 mg  10/10/21 17:12 





  Acetaminophen 325 Mg Tab  PO  





  Q4H PRN  





  Pain (Mild 1-3)/fever  


 


Ascorbic Acid  500 mg  10/11/21 09:00 





  Ascorbic Acid 500 Mg Tab  PO  





  DAILY [facility]  


 


Aspirin  81 mg  10/11/21 09:00 





  Aspirin 81 Mg Tab.Ec  PO  





  DAILY [facility]  


 


Calcium Carbonate  1 tab  10/11/21 09:00 





  Calcium Carbonate/Vitamin D3 1250 Mg-5 Mcg Tab  PO  





  DAILY [facility]  


 


Calcium Carbonate/Glycine  500 mg  10/10/21 17:10 





  Calcium Carbonate 500 Mg Tab.Chew  PO  





  Q4H PRN  





  Heartburn  


 


Dextrose/Water  50 ml  10/10/21 17:10 





  50% Dextrose In Water 50 Ml Syringe  IVPUSH  





  Q15M PRN  





  Hypoglycemia  


 


Docusate Sodium  100 mg  10/10/21 17:12 





  Docusate Sodium 100 Mg Cap  PO  





  BID PRN  





  Constipation  


 


Duloxetine HCl  30 mg  10/11/21 09:00 





  Duloxetine 30 Mg Cap  PO  





  DAILY [facility]  


 


Enoxaparin Sodium  40 mg  10/11/21 09:00 





  Enoxaparin 40 Mg/0.4 Ml Syringe  SUBCUT  





  DAILY [facility]  


 


Glucagon  1 mg  10/10/21 17:10 





  Glucagon,Human Recombinant 1 Mg Vial  IM  





  Q15M PRN  





  Hypoglycemia  


 


Ceftriaxone Sodium 1 gm/  50 mls @ 100 mls/hr  10/10/21 19:00  10/10/21 19:59





  Sodium Chloride  IV   100 mls/hr





  Q24H [facility]   Administration


 


Insulin Glargine  37 unit  10/11/21 09:00 





  Insulin Glarg,Human.Rec.Analog 100 Unit/Ml  SUBCUT  





  DAILY [facility]  


 


Magnesium Hydroxide  30 ml  10/10/21 17:10 





  Magnesium Hydroxide 400 Mg/5 Ml Susp 30 Ml Cup  PO  





  DAILY PRN  





  Constipation  


 


Meclizine HCl  25 mg  10/10/21 17:25 





  Meclizine 12.5 Mg Tab  PO  





  Q12H PRN  





  vertigo  


 


Multivitamins/Minerals/Vitamin C  1 tab  10/11/21 09:00 





  Multivitamin Tab  PO  





  DAILY [facility]  


 


Non-Formulary Medication  1 applic  10/10/21 17:10 





  Diclofenac Sodium [Voltaren Arthritis Pain]  TOP  





  Q12H PRN  





  Low Back Pain  


 


Ondansetron HCl  4 mg  10/10/21 17:12 





  Ondansetron 4 Mg/2 Ml Sdv  IVPUSH  





  Q6H PRN  





  Nausea/Vomiting  


 


Polyethylene Glycol  17 gm  10/10/21 17:10 





  Polyethylene Glycol 3350 Powder 17 Gm Packet  PO  





  DAILY PRN  





  Constipation  














Discontinued Medications














Generic Name Dose Route Start Last Admin





  Trade Name Freq  PRN Reason Stop Dose Admin


 


Ceftriaxone Sodium  Confirm  10/10/21 19:46  10/10/21 20:11





  Ceftriaxone 1 Gm Vial  Administered  10/10/21 19:47  Not Given





  Dose  





  1 gm  





  .ROUTE  





  .STK-MED ONE  


 


Sodium Chloride  500 mls @ 500 mls/hr  10/10/21 15:00  10/10/21 18:03





  Normal Saline  IV   Infused





  .BOLUS [facility]   Infusion


 


Meclizine HCl  25 mg  10/10/21 14:50  10/10/21 14:59





  Meclizine 12.5 Mg Tab  PO  10/10/21 14:51  25 mg





  ONETIME ONE   Administration














- Radiology Interpretation


Free Text/Narrative:: 


[facility]


Final Radiology Report  Call: [phone]


assistance Online chat: [email or web address]


Name: SARA SINGER Age: 89Years F Date: 10/10/2021


MRN: [id number] SSN: -- : [id number]


Study: CT HEAD WO CONT Requesting Physician: SANG ALMAGUER


Accession: [id number] Images: [id number]


Addl Studies:


Provided Clinical History: Dizziness


Contrast: Without Contrast Medium:


Contrast Amount: Contrast Method:


CONFIDENTIALITY STATEMENT


This report is intended only for use by the referring physician, and only in 

accordance with law. If you received this in error, call [phone].


Page 1 of 1


PROCEDURE INFORMATION:


Exam: CT Head Without Contrast


Exam date and time: 10/10/2021 12:56 PM


Age: 89 years old


Clinical indication: Dizziness


TECHNIQUE:


Imaging protocol: Computed tomography of the head without contrast.


Radiation optimization: All CT scans at this facility use at least one of these 

dose optimization


techniques: automated exposure control; mA and/or kV adjustment per patient size

(includes targeted


exams where dose is matched to clinical indication); or iterative 

reconstruction.


COMPARISON:


CT Head wo Cont 10/6/2015 12:29 AM


FINDINGS:


Brain: Atrophy. No hemorrhage. Chronic periventricular white matter changes.. No

mass effect.


Cerebral ventricles: Stable asymmetric enlargement of the left ventricle.


Paranasal sinuses: Visualized sinuses are unremarkable. No fluid levels.


Mastoid air cells: Visualized mastoid air cells are well aerated.


Bones/joints: Unremarkable. No acute fracture.


Soft tissues: Unremarkable.


IMPRESSION:


No acute intracranial abnormality.


Thank you for allowing us to participate in the care of your patient.


Dictated and Authenticated by: Raúl Patino MD


10/10/2021 2:06 PM Central Time ([place] & [place])








- Re-Assessments/Exams


Free Text/Narrative Re-Assessment/Exam: 





10/10/21


Pt to be admitted to observation due to dizziness and gen. weakness. Unable to 

collect urine, and needs to have UTI ruled out. Pt will be admitted to Dr. Torres.








Departure





- Departure


Time of Disposition: 14:30 (admitted to Dr. Torres)


Disposition: Refer to Observation


Condition: Good


Clinical Impression: 


 Dizziness








- Discharge Information





- My Orders


Last 24 Hours: 


My Active Orders





10/10/21 12:50


Orthostatic Vital Signs [RC] ASDIRECTED 





10/10/21 17:00


CULTURE URINE [RM] Stat 














- Assessment/Plan


Last 24 Hours: 


My Active Orders





10/10/21 12:50


Orthostatic Vital Signs [RC] ASDIRECTED 





10/10/21 17:00


CULTURE URINE [RM] Stat 














I have read and agree with the documentation that has been completed regarding 

this visit. By signing this record, I attest that the documentation was 

completed in my physical presence and is an accurate record of the encounter.

## 2021-10-12 NOTE — PCM.PN
- General Info


Date of Service: 10/12/21


Subjective Update: 


No dizziness





Functional Status: Reports: Pain Controlled, Tolerating Diet





- Review of Systems


General: Denies: Fever


Pulmonary: Denies: Shortness of Breath


Cardiovascular: Denies: Chest Pain


Gastrointestinal: Denies: Abdominal Pain


Neurological: Denies: Confusion, Dizziness


Psychiatric: Denies: Confusion





- Patient Data


Vitals - Most Recent: 


                                Last Vital Signs











Temp  97.2 F   10/12/21 08:00


 


Pulse  78   10/12/21 08:00


 


Resp  24 H  10/12/21 08:00


 


BP  152/62 H  10/12/21 08:00


 


Pulse Ox  95   10/12/21 08:00








                                        





Orthostatic Blood Pressure [     149/72


Standing]                        


Orthostatic Blood Pressure [     105/62


Sitting]                         


Orthostatic Blood Pressure [     121/57


Supine]                          








Weight - Most Recent: 160 lb 4.8 oz


I&O - Last 24 Hours: 


                                 Intake & Output











 10/11/21 10/12/21 10/12/21





 22:59 06:59 14:59


 


Intake Total 400 300 


 


Balance 400 300 











Lab Results Last 24 Hours: 


                         Laboratory Results - last 24 hr











  10/11/21 10/11/21 10/11/21 Range/Units





  11:28 16:30 20:08 


 


POC Glucose  201 H  212 H  189 H  (70-99)  mg/dL














  10/12/21 Range/Units





  08:04 


 


POC Glucose  148 H  (70-99)  mg/dL











Glenn Results Last 24 Hours: 


                                  Microbiology











 10/10/21 17:00 Urine Culture - Final





 Urine, Voided    Klebsiella Oxytoca











Med Orders - Current: 


                               Current Medications





Acetaminophen (Acetaminophen 500 Mg Tab)  500 mg PO Q4H PRN


   PRN Reason: Pain (mild 1-3)


   Last Admin: 10/11/21 03:22 Dose:  500 mg


   Documented by: 


Ascorbic Acid (Ascorbic Acid 500 Mg Tab)  500 mg PO DAILY [facility]


   Last Admin: 10/12/21 08:08 Dose:  500 mg


   Documented by: 


Aspirin (Aspirin 81 Mg Tab.Ec)  81 mg PO DAILY [facility]


   Last Admin: 10/12/21 08:08 Dose:  81 mg


   Documented by: 


Calcium Carbonate (Calcium Carbonate/Vitamin D3 1250 Mg-5 Mcg Tab)  1 tab PO 

DAILY [facility]


   Last Admin: 10/12/21 08:08 Dose:  1 tab


   Documented by: 


Calcium Carbonate/Glycine (Calcium Carbonate 500 Mg Tab.Chew)  500 mg PO Q4H PRN


   PRN Reason: Heartburn


Dextrose/Water (50% Dextrose In Water 50 Ml Syringe)  50 ml IVPUSH Q15M PRN


   PRN Reason: Hypoglycemia


Docusate Sodium (Docusate Sodium 100 Mg Cap)  100 mg PO BID PRN


   PRN Reason: Constipation


Duloxetine HCl (Duloxetine 30 Mg Cap)  30 mg PO DAILY [facility]


   Last Admin: 10/12/21 08:08 Dose:  30 mg


   Documented by: 


Enoxaparin Sodium (Enoxaparin 40 Mg/0.4 Ml Syringe)  40 mg SUBCUT DAILY [facility]


   Last Admin: 10/12/21 08:08 Dose:  40 mg


   Documented by: 


Glucagon (Glucagon,Human Recombinant 1 Mg Vial)  1 mg IM Q15M PRN


   PRN Reason: Hypoglycemia


Ceftriaxone Sodium 1 gm/ (Sodium Chloride)  50 mls @ 100 mls/hr IV Q24H [facility]


   Last Admin: 10/11/21 18:55 Dose:  100 mls/hr


   Documented by: 


Insulin Glargine (Insulin Glarg,Human.Rec.Analog 100 Unit/Ml)  37 unit SUBCUT 

DAILY [facility]


   Last Admin: 10/12/21 09:06 Dose:  37 unit


   Documented by: 


Magnesium Hydroxide (Magnesium Hydroxide 400 Mg/5 Ml Susp 30 Ml Cup)  30 ml PO 

DAILY PRN


   PRN Reason: Constipation


Meclizine HCl (Meclizine 12.5 Mg Tab)  25 mg PO Q12H PRN


   PRN Reason: vertigo


Multivitamins/Minerals/Vitamin C (Multivitamin Tab)  1 tab PO DAILY [facility]


   Last Admin: 10/12/21 08:08 Dose:  1 tab


   Documented by: 


Ondansetron HCl (Ondansetron 4 Mg/2 Ml Sdv)  4 mg IVPUSH Q6H PRN


   PRN Reason: Nausea/Vomiting


Polyethylene Glycol (Polyethylene Glycol 3350 Powder 17 Gm Packet)  17 gm PO 

DAILY PRN


   PRN Reason: Constipation





Discontinued Medications





Ceftriaxone Sodium (Ceftriaxone 1 Gm Vial) Confirm Administered Dose 1 gm .ROUTE

.[facility]


   Stop: 10/10/21 19:47


   Last Admin: 10/10/21 20:11 Dose:  Not Given


   Documented by: 


Sodium Chloride (Normal Saline)  500 mls @ 500 mls/hr IV .BOLUS [facility]


   Last Infusion: 10/10/21 18:03 Dose:  Infused


   Documented by: 


Meclizine HCl (Meclizine 12.5 Mg Tab)  25 mg PO ONETIME ONE


   Stop: 10/10/21 14:51


   Last Admin: 10/10/21 14:59 Dose:  25 mg


   Documented by: 











- Exam


General: Alert, Oriented, Cooperative


HEENT: EOMI


Lungs: Clear to Auscultation, Normal Respiratory Effort


Cardiovascular: Regular Rate, Regular Rhythm


GI/Abdominal Exam: Normal Bowel Sounds, Soft


Back Exam: Normal Inspection


Extremities: Normal Inspection


Skin: Warm, Dry


Neurological: No New Focal Deficit


Psy/Mental Status: Alert





- Patient Data


Lab Results Last 24 hrs: 


                         Laboratory Results - last 24 hr











  10/11/21 10/11/21 10/11/21 Range/Units





  11:28 16:30 20:08 


 


POC Glucose  201 H  212 H  189 H  (70-99)  mg/dL














  10/12/21 Range/Units





  08:04 


 


POC Glucose  148 H  (70-99)  mg/dL











Result Diagrams: 


                                 10/10/21 13:02





                                 10/10/21 13:02


[facility]enn Results Last 24 hrs: 


                                  Microbiology











 10/10/21 17:00 Urine Culture - Final





 Urine, Voided    Klebsiella Oxytoca














Sepsis Event Note





- Evaluation


Sepsis Screening Result: No Definite Risk





- Focused Exam


Vital Signs: 


                                   Vital Signs











  Temp Pulse Resp BP Pulse Ox


 


 10/12/21 08:00  97.2 F  78  24 H  152/62 H  95


 


 10/12/21 04:00  97.7 F  86  19  154/66 H  95














- Problem List Review


Problem List Initiated/Reviewed/Updated: No





- My Orders


Last 24 Hours: 


My Active Orders





10/11/21 16:26


Patient Status [ADT] Routine 














- Plan


Plan:: 


Vertigo/Meclizine 25 mg BID PRN.  symptoms resolved. 


at risk for falls ; admitted for observation. PT/OT evaluation. Family are 

looking into the option of NH placement. 





DM type II : continue with home medication. 





Bacteruria : ?  UTI: awaiting cult results. last dose of Ceftriaxone in  AM. 


DVT prophylaxis : Lovenox. 





DNR /DNI as per assisted living record.

## 2021-10-13 NOTE — PCM.PN
- General Info


Date of Service: 10/13/21


Subjective Update: 


No dizziness





Functional Status: Reports: Pain Controlled, Tolerating Diet, Ambulating





- Review of Systems


General: Denies: Fever


Pulmonary: Denies: Shortness of Breath


Cardiovascular: Denies: Chest Pain


Gastrointestinal: Denies: Abdominal Pain


Neurological: Denies: Confusion


Psychiatric: Denies: No Symptoms





- Patient Data


Vitals - Most Recent: 


                                Last Vital Signs











Temp  98.5 F   10/13/21 08:00


 


Pulse  85   10/13/21 08:00


 


Resp  16   10/13/21 08:00


 


BP  126/57 L  10/13/21 08:00


 


Pulse Ox  95   10/13/21 08:00








                                        





Orthostatic Blood Pressure [     149/72


Standing]                        


Orthostatic Blood Pressure [     105/62


Sitting]                         


Orthostatic Blood Pressure [     121/57


Supine]                          








Weight - Most Recent: 160 lb 4.8 oz


I&O - Last 24 Hours: 


                                 Intake & Output











 10/12/21 10/13/21 10/13/21





 22:59 06:59 14:59


 


Intake Total 50 200 


 


Balance 50 200 











Lab Results Last 24 Hours: 


                         Laboratory Results - last 24 hr











  10/12/21 10/12/21 10/12/21 Range/Units





  11:52 16:49 21:27 


 


POC Glucose  174 H  150 H  170 H  (70-99)  mg/dL














  10/13/21 Range/Units





  07:47 


 


POC Glucose  143 H  (70-99)  mg/dL











Glenn Results Last 24 Hours: 


                                  Microbiology











 10/10/21 17:00 Urine Culture - Final





 Urine, Voided    Klebsiella Oxytoca











Med Orders - Current: 


                               Current Medications





Acetaminophen (Acetaminophen 500 Mg Tab)  500 mg PO Q4H PRN


   PRN Reason: Pain (mild 1-3)


   Last Admin: 10/13/21 10:47 Dose:  500 mg


   Documented by: 


Ascorbic Acid (Ascorbic Acid 500 Mg Tab)  500 mg PO DAILY [facility]


   Last Admin: 10/13/21 09:18 Dose:  500 mg


   Documented by: 


Aspirin (Aspirin 81 Mg Tab.Ec)  81 mg PO DAILY [facility]


   Last Admin: 10/13/21 09:18 Dose:  81 mg


   Documented by: 


Calcium Carbonate (Calcium Carbonate/Vitamin D3 1250 Mg-5 Mcg Tab)  1 tab PO 

DAILY [facility]


   Last Admin: 10/13/21 09:18 Dose:  1 tab


   Documented by: 


Calcium Carbonate/Glycine (Calcium Carbonate 500 Mg Tab.Chew)  500 mg PO Q4H PRN


   PRN Reason: Heartburn


Dextrose/Water (50% Dextrose In Water 50 Ml Syringe)  50 ml IVPUSH Q15M PRN


   PRN Reason: Hypoglycemia


Docusate Sodium (Docusate Sodium 100 Mg Cap)  100 mg PO BID PRN


   PRN Reason: Constipation


Duloxetine HCl (Duloxetine 30 Mg Cap)  30 mg PO DAILY [facility]


   Last Admin: 10/13/21 09:18 Dose:  30 mg


   Documented by: 


Enoxaparin Sodium (Enoxaparin 40 Mg/0.4 Ml Syringe)  40 mg SUBCUT DAILY [facility]


   Last Admin: 10/13/21 09:19 Dose:  40 mg


   Documented by: 


Glucagon (Glucagon,Human Recombinant 1 Mg Vial)  1 mg IM Q15M PRN


   PRN Reason: Hypoglycemia


Ceftriaxone Sodium 1 gm/ (Sodium Chloride)  50 mls @ 100 mls/hr IV Q24H [facility]


   Last Admin: 10/12/21 19:51 Dose:  100 mls/hr


   Documented by: 


Insulin Glargine (Insulin Glarg,Human.Rec.Analog 100 Unit/Ml)  37 unit SUBCUT 

DAILY [facility]


   Last Admin: 10/13/21 09:25 Dose:  37 unit


   Documented by: 


Magnesium Hydroxide (Magnesium Hydroxide 400 Mg/5 Ml Susp 30 Ml Cup)  30 ml PO 

DAILY PRN


   PRN Reason: Constipation


Meclizine HCl (Meclizine 12.5 Mg Tab)  25 mg PO Q12H PRN


   PRN Reason: vertigo


Multivitamins/Minerals/Vitamin C (Multivitamin Tab)  1 tab PO DAILY [facility]


   Last Admin: 10/13/21 09:17 Dose:  1 tab


   Documented by: 


Ondansetron HCl (Ondansetron 4 Mg/2 Ml Sdv)  4 mg IVPUSH Q6H PRN


   PRN Reason: Nausea/Vomiting


Polyethylene Glycol (Polyethylene Glycol 3350 Powder 17 Gm Packet)  17 gm PO 

DAILY PRN


   PRN Reason: Constipation





Discontinued Medications





Ceftriaxone Sodium (Ceftriaxone 1 Gm Vial) Confirm Administered Dose 1 gm .ROUTE

.[facility]


   Stop: 10/10/21 19:47


   Last Admin: 10/10/21 20:11 Dose:  Not Given


   Documented by: 


Sodium Chloride (Normal Saline)  500 mls @ 500 mls/hr IV .BOLUS [facility]


   Last Infusion: 10/10/21 18:03 Dose:  Infused


   Documented by: 


Meclizine HCl (Meclizine 12.5 Mg Tab)  25 mg PO ONETIME ONE


   Stop: 10/10/21 14:51


   Last Admin: 10/10/21 14:59 Dose:  25 mg


   Documented by: 











- Exam


Quality Assessment: No: Supplemental Oxygen


General: Alert, Oriented, Cooperative


Neck: Supple


Lungs: Clear to Auscultation, Normal Respiratory Effort


Cardiovascular: Regular Rate, Regular Rhythm


GI/Abdominal Exam: Soft, Non-Tender


Extremities: No Pedal Edema


Skin: Dry


Neurological: No New Focal Deficit


Psy/Mental Status: Alert, Normal Affect





- Patient Data


Lab Results Last 24 hrs: 


                         Laboratory Results - last 24 hr











  10/12/21 10/12/21 10/12/21 Range/Units





  11:52 16:49 21:27 


 


POC Glucose  174 H  150 H  170 H  (70-99)  mg/dL














  10/13/21 Range/Units





  07:47 


 


POC Glucose  143 H  (70-99)  mg/dL











Result Diagrams: 


                                 10/10/21 13:02





                                 10/10/21 13:02


Glenn Results Last 24 hrs: 


                                  Microbiology











 10/10/21 17:00 Urine Culture - Final





 Urine, Voided    Klebsiella Oxytoca














Sepsis Event Note





- Evaluation


Sepsis Screening Result: No Definite Risk





- Focused Exam


Vital Signs: 


                                   Vital Signs











  Temp Pulse Resp BP BP Pulse Ox


 


 10/13/21 08:00  98.5 F  85  16  126/57 L   95


 


 10/13/21 04:20  98 F  85  14   158/66 H  95


 


 10/13/21 01:10  97.4 F  81  14   153/72 H  96














- Problem List Review


Problem List Initiated/Reviewed/Updated: No





- Plan


Plan:: 


Vertigo/Meclizine 25 mg BID PRN.  symptoms resolved. 


at risk for falls ; admitted for observation. PT/OT evaluation. Family are 

looking into the option of NH placement. 





DM type II : continue with home medication. 





Bacteruria : ?  UTI: awaiting cult results. last dose of Ceftriaxone in  AM. 


DVT prophylaxis : Lovenox. 





DNR /DNI as per assisted living record.

## 2021-10-14 NOTE — PCM.DCSUM1
**Discharge Summary





- Hospital Course


Free Text/Narrative:: 





Patient was brought to ER form her assisted living  with c/o waking with "room 

spin" dizziness. Denies fall or head injury. Denies chest pain, rapid or 

irregular HR, N/V/D, fever, chills, cough, SOB, or dysuria. She reports similar 

episode in the past. Pt states that laying still alleviates the dizziness. 

Moving her body, or even head movement makes the dizziness worse. She laid on 

her bed all day to minimize the dizziness, and she missed her insulin then 

finally called the ambulance.


CT in ER showed no acute findings. Pt was prescribed Meclizine in ER and she is 

now symptoms free. 





Vertigo:  symptoms resolved since admission. Meclizine 25 mg BID PRN.


at risk for falls: evaluated by PT/OT evaluation. Family would like to move her 

into NH. 





DM type II : continue with home medication. 





Bacteruria : ?  UTI: Klebsiella treated with Ceftriaxone then switched to Cipro





DVT prophylaxis : Lovenox. 











- Discharge Data


Discharge Date: 10/14/21


Discharge Disposition: DC/Tfer to [facility]


Condition: Good





- Referral to Home Health


Primary Care Physician: 


PCP None








- Patient Summary/Data


Consults: 


                                  Consultations





10/10/21 17:12


OT Evaluation and Treatment [CONS] Routine 


PT Evaluation and Treatment [CONS] Routine 














- Discharge Plan


*PRESCRIPTION DRUG MONITORING PROGRAM REVIEWED*: No


*COPY OF PRESCRIPTION DRUG MONITORING REPORT IN PATIENT SANGEETA: No


Prescriptions/Med Rec: 


Meclizine [Antivert] 25 mg PO Q12H PRN 15 Days #30 tablet


 PRN Reason: vertigo


Ciprofloxacin [Ciprofloxacin HCl] 250 mg PO BID 3 Days #6 tablet


Home Medications: 


                                    Home Meds





Acetaminophen [Tylenol Extra Strength] 500 mg PO Q4H PRN 10/10/21 [History]


Aspirin [Halfprin] 81 mg PO DAILY 10/10/21 [History]


Ca/D3/Mag Ox/Zinc//Barrett/Bor [Calcium 600-D3 Plus Caplet] 1 tab PO BID@1200,17

00 10/10/21 [History]


DULoxetine HCl [Cymbalta] 30 mg PO DAILY 10/10/21 [History]


Diclofenac Sodium [Voltaren Arthritis Pain] 1 applic TOP Q12H PRN 10/10/21 

[History]


Fish Oil/DHA/EPA [Fish Oil 1,200 MG] 1 cap PO DAILY@1200 10/10/21 [History]


Insulin Glarg,Human.Rec.Analog [Lantus] 37 units SQ DAILY@1200 10/10/21 

[History]


Magnesium Hydroxide [Milk of Magnesia] 30 ml PO DAILY PRN 10/10/21 [History]


Multivitamin [Multi-Day Vitamins] 1 tab PO DAILY 10/10/21 [History]


polyethylene glycoL 3350 [MiraLAX] 1 pack PO DAILY 10/10/21 [History]


Acetaminophen 650 mg PO BID 10/11/21 [History]


Ammonium Lactate 1 applic TOP DAILY PRN 10/11/21 [History]


Ascorbic Acid [Acerola C] 500 mg PO DAILY 10/11/21 [History]


Docusate Sodium/Sennosides [Senokot-S] 1 tab PO BID 10/11/21 [History]


Famotidine [Pepcid AC] 10 mg PO BID 10/11/21 [History]


metFORMIN [Glucophage] 500 mg PO BID 10/11/21 [History]


Ciprofloxacin [Ciprofloxacin HCl] 250 mg PO BID 3 Days #6 tablet 10/14/21 [Rx]


Docusate Sodium [Colace] 100 mg PO BID PRN  cap 10/14/21 [Rx]


Enoxaparin [Lovenox] 40 mg SUBCUT DAILY  syringe 10/14/21 [Rx]


Meclizine [Antivert] 25 mg PO Q12H PRN 15 Days #30 tablet 10/14/21 [Rx]








Referrals: 


PCP,None [Primary Care Provider] - 





- Discharge Summary/Plan Comment


DC Time >30 min.: Yes


Total # of Minutes for Discharge Time: 


35 min








- General Info


Date of Service: 10/14/21


Functional Status: Reports: Pain Controlled, Tolerating Diet, Ambulating





- Review of Systems


General: Denies: Fever


Pulmonary: Denies: Shortness of Breath


Cardiovascular: Denies: Chest Pain


Gastrointestinal: Denies: Abdominal Pain


Genitourinary: Denies: Dysuria


Musculoskeletal: Denies: Neck Pain





- Patient Data


Vitals - Most Recent: 


                                Last Vital Signs











Temp  98.6 F   10/14/21 06:00


 


Pulse  71   10/14/21 06:00


 


Resp  14   10/14/21 06:00


 


BP  104/65   10/14/21 06:00


 


Pulse Ox  96   10/14/21 06:00








                                        





Orthostatic Blood Pressure [     80/40


Standing]                        


Orthostatic Blood Pressure [     86/61


Sitting]                         


Orthostatic Blood Pressure [     100/58


Supine]                          








Weight - Most Recent: 160 lb 4.8 oz


I&O - Last 24 hours: 


                                 Intake & Output











 10/13/21 10/14/21 10/14/21





 22:59 06:59 14:59


 


Intake Total 440 100 


 


Balance 440 100 











Lab Results - Last 24 hrs: 


                         Laboratory Results - last 24 hr











  10/13/21 10/13/21 10/13/21 Range/Units





  11:36 16:57 20:55 


 


POC Glucose  205 H  181 H  186 H  (70-99)  mg/dL














  10/14/21 Range/Units





  07:26 


 


POC Glucose  139 H  (70-99)  mg/dL











Med Orders - Current: 


                               Current Medications





Acetaminophen (Acetaminophen 500 Mg Tab)  500 mg PO Q4H PRN


   PRN Reason: Pain (mild 1-3)


   Last Admin: 10/13/21 22:57 Dose:  500 mg


   Documented by: 


Ascorbic Acid (Ascorbic Acid 500 Mg Tab)  500 mg PO DAILY [facility]


   Last Admin: 10/14/21 08:45 Dose:  500 mg


   Documented by: 


Aspirin (Aspirin 81 Mg Tab.Ec)  81 mg PO DAILY [facility]


   Last Admin: 10/14/21 08:48 Dose:  81 mg


   Documented by: 


Calcium Carbonate (Calcium Carbonate/Vitamin D3 1250 Mg-5 Mcg Tab)  1 tab PO 

DAILY [facility]


   Last Admin: 10/14/21 08:46 Dose:  1 tab


   Documented by: 


Calcium Carbonate/Glycine (Calcium Carbonate 500 Mg Tab.Chew)  500 mg PO Q4H PRN


   PRN Reason: Heartburn


Ciprofloxacin (Ciprofloxacin 500 Mg Tab)  250 mg PO BID [facility]


   Stop: 10/18/21 23:00


   Last Admin: 10/14/21 08:48 Dose:  250 mg


   Documented by: 


Dextrose/Water (50% Dextrose In Water 50 Ml Syringe)  50 ml IVPUSH Q15M PRN


   PRN Reason: Hypoglycemia


Docusate Sodium (Docusate Sodium 100 Mg Cap)  100 mg PO BID PRN


   PRN Reason: Constipation


   Last Admin: 10/13/21 20:20 Dose:  100 mg


   Documented by: 


Duloxetine HCl (Duloxetine 30 Mg Cap)  30 mg PO DAILY [facility]


   Last Admin: 10/14/21 08:48 Dose:  30 mg


   Documented by: 


Enoxaparin Sodium (Enoxaparin 40 Mg/0.4 Ml Syringe)  40 mg SUBCUT DAILY [facility]


   Last Admin: 10/14/21 08:50 Dose:  40 mg


   Documented by: 


Glucagon (Glucagon,Human Recombinant 1 Mg Vial)  1 mg IM Q15M PRN


   PRN Reason: Hypoglycemia


Insulin Glargine (Insulin Glarg,Human.Rec.Analog 100 Unit/Ml)  37 unit SUBCUT 

DAILY [facility]


   Last Admin: 10/13/21 09:25 Dose:  37 unit


   Documented by: 


Insulin Human Lispro (Insulin Lispro 100 Units/Ml 3 Ml Vial)  0 unit SUBCUT 

WITHMEALSANDBED [facility]; Protocol


   Last Admin: 10/13/21 21:12 Dose:  1 unit


   Documented by: 


Magnesium Hydroxide (Magnesium Hydroxide 400 Mg/5 Ml Susp 30 Ml Cup)  30 ml PO 

DAILY PRN


   PRN Reason: Constipation


   Last Admin: 10/13/21 20:20 Dose:  30 ml


   Documented by: 


Meclizine HCl (Meclizine 12.5 Mg Tab)  25 mg PO Q12H PRN


   PRN Reason: vertigo


Multivitamins/Minerals/Vitamin C (Multivitamin Tab)  1 tab PO DAILY [facility]


   Last Admin: 10/14/21 08:46 Dose:  1 tab


   Documented by: 


Ondansetron HCl (Ondansetron 4 Mg/2 Ml Sdv)  4 mg IVPUSH Q6H PRN


   PRN Reason: Nausea/Vomiting


Polyethylene Glycol (Polyethylene Glycol 3350 Powder 17 Gm Packet)  17 gm PO 

DAILY PRN


   PRN Reason: Constipation





Discontinued Medications





Ceftriaxone Sodium (Ceftriaxone 1 Gm Vial) Confirm Administered Dose 1 gm .ROUTE

.[facility]


   Stop: 10/10/21 19:47


   Last Admin: 10/10/21 20:11 Dose:  Not Given


   Documented by: 


Sodium Chloride (Normal Saline)  500 mls @ 500 mls/hr IV .BOLUS [facility]


   Last Infusion: 10/10/21 18:03 Dose:  Infused


   Documented by: 


Ceftriaxone Sodium 1 gm/ (Sodium Chloride)  50 mls @ 100 mls/hr IV Q24H [facility]


   Last Admin: 10/12/21 19:51 Dose:  100 mls/hr


   Documented by: 


Meclizine HCl (Meclizine 12.5 Mg Tab)  25 mg PO ONETIME ONE


   Stop: 10/10/21 14:51


   Last Admin: 10/10/21 14:59 Dose:  25 mg


   Documented by: 











- Exam


Quality Assessment: Denies: Supplemental Oxygen


General: Reports: Alert, Cooperative


HEENT: Reports: EOMI


Neck: Reports: Supple


Lungs: Reports: Clear to Auscultation, Normal Respiratory Effort


Cardiovascular: Reports: Regular Rate, Regular Rhythm


GI/Abdominal Exam: Soft, Non-Tender


Skin: Reports: Warm


Neurological: Reports: No New Focal Deficit


Psy/Mental Status: Reports: Alert, Normal Affect, Normal Mood